# Patient Record
Sex: FEMALE | Employment: UNEMPLOYED | URBAN - METROPOLITAN AREA
[De-identification: names, ages, dates, MRNs, and addresses within clinical notes are randomized per-mention and may not be internally consistent; named-entity substitution may affect disease eponyms.]

---

## 2017-10-11 DIAGNOSIS — Z00.00 WELL ADULT EXAM: ICD-10-CM

## 2017-11-07 ENCOUNTER — HOSPITAL ENCOUNTER (OUTPATIENT)
Dept: RADIOLOGY | Facility: MEDICAL CENTER | Age: 37
End: 2017-11-07
Attending: FAMILY MEDICINE
Payer: COMMERCIAL

## 2017-11-07 ENCOUNTER — HOSPITAL ENCOUNTER (OUTPATIENT)
Facility: MEDICAL CENTER | Age: 37
End: 2017-11-07
Attending: FAMILY MEDICINE
Payer: COMMERCIAL

## 2017-11-07 ENCOUNTER — APPOINTMENT (OUTPATIENT)
Dept: OTHER | Facility: MEDICAL CENTER | Age: 37
End: 2017-11-07

## 2017-11-07 DIAGNOSIS — Z00.00 WELL ADULT EXAM: ICD-10-CM

## 2017-11-07 DIAGNOSIS — Z00.00 PHYSICAL EXAM, ROUTINE: ICD-10-CM

## 2017-11-07 LAB
25(OH)D3 SERPL-MCNC: 14 NG/ML (ref 30–100)
ALBUMIN SERPL BCP-MCNC: 4.5 G/DL (ref 3.2–4.9)
ALBUMIN/GLOB SERPL: 1.4 G/DL
ALP SERPL-CCNC: 77 U/L (ref 30–99)
ALT SERPL-CCNC: 13 U/L (ref 2–50)
ANION GAP SERPL CALC-SCNC: 10 MMOL/L (ref 0–11.9)
APPEARANCE UR: CLEAR
AST SERPL-CCNC: 18 U/L (ref 12–45)
BACTERIA #/AREA URNS HPF: NEGATIVE /HPF
BASOPHILS # BLD AUTO: 0.9 % (ref 0–1.8)
BASOPHILS # BLD: 0.06 K/UL (ref 0–0.12)
BILIRUB SERPL-MCNC: 0.9 MG/DL (ref 0.1–1.5)
BILIRUB UR QL STRIP.AUTO: NEGATIVE
BUN SERPL-MCNC: 11 MG/DL (ref 8–22)
CALCIUM SERPL-MCNC: 9.3 MG/DL (ref 8.5–10.5)
CHLORIDE SERPL-SCNC: 106 MMOL/L (ref 96–112)
CO2 SERPL-SCNC: 24 MMOL/L (ref 20–33)
COLOR UR: YELLOW
CREAT SERPL-MCNC: 0.62 MG/DL (ref 0.5–1.4)
CREAT UR-MCNC: 44.8 MG/DL
CRP SERPL HS-MCNC: 2.6 MG/L (ref 0–7.5)
EOSINOPHIL # BLD AUTO: 0.09 K/UL (ref 0–0.51)
EOSINOPHIL NFR BLD: 1.4 % (ref 0–6.9)
EPI CELLS #/AREA URNS HPF: ABNORMAL /HPF
ERYTHROCYTE [DISTWIDTH] IN BLOOD BY AUTOMATED COUNT: 46.5 FL (ref 35.9–50)
EST. AVERAGE GLUCOSE BLD GHB EST-MCNC: 114 MG/DL
GFR SERPL CREATININE-BSD FRML MDRD: >60 ML/MIN/1.73 M 2
GLOBULIN SER CALC-MCNC: 3.3 G/DL (ref 1.9–3.5)
GLUCOSE SERPL-MCNC: 83 MG/DL (ref 65–99)
GLUCOSE UR STRIP.AUTO-MCNC: NEGATIVE MG/DL
HBA1C MFR BLD: 5.6 % (ref 0–5.6)
HCT VFR BLD AUTO: 47.7 % (ref 37–47)
HGB BLD-MCNC: 15.5 G/DL (ref 12–16)
HYALINE CASTS #/AREA URNS LPF: ABNORMAL /LPF
IMM GRANULOCYTES # BLD AUTO: 0.04 K/UL (ref 0–0.11)
IMM GRANULOCYTES NFR BLD AUTO: 0.6 % (ref 0–0.9)
KETONES UR STRIP.AUTO-MCNC: NEGATIVE MG/DL
LEUKOCYTE ESTERASE UR QL STRIP.AUTO: ABNORMAL
LYMPHOCYTES # BLD AUTO: 2.32 K/UL (ref 1–4.8)
LYMPHOCYTES NFR BLD: 35.3 % (ref 22–41)
MCH RBC QN AUTO: 30 PG (ref 27–33)
MCHC RBC AUTO-ENTMCNC: 32.5 G/DL (ref 33.6–35)
MCV RBC AUTO: 92.3 FL (ref 81.4–97.8)
MICRO URNS: ABNORMAL
MICROALBUMIN UR-MCNC: <0.7 MG/DL
MICROALBUMIN/CREAT UR: NORMAL MG/G (ref 0–30)
MONOCYTES # BLD AUTO: 0.47 K/UL (ref 0–0.85)
MONOCYTES NFR BLD AUTO: 7.2 % (ref 0–13.4)
NEUTROPHILS # BLD AUTO: 3.59 K/UL (ref 2–7.15)
NEUTROPHILS NFR BLD: 54.6 % (ref 44–72)
NITRITE UR QL STRIP.AUTO: NEGATIVE
NRBC # BLD AUTO: 0 K/UL
NRBC BLD AUTO-RTO: 0 /100 WBC
PH UR STRIP.AUTO: 7 [PH]
PLATELET # BLD AUTO: 312 K/UL (ref 164–446)
PMV BLD AUTO: 11.2 FL (ref 9–12.9)
POTASSIUM SERPL-SCNC: 4.3 MMOL/L (ref 3.6–5.5)
PROT SERPL-MCNC: 7.8 G/DL (ref 6–8.2)
PROT UR QL STRIP: NEGATIVE MG/DL
RBC # BLD AUTO: 5.17 M/UL (ref 4.2–5.4)
RBC # URNS HPF: ABNORMAL /HPF
RBC UR QL AUTO: ABNORMAL
SODIUM SERPL-SCNC: 140 MMOL/L (ref 135–145)
SP GR UR STRIP.AUTO: 1.01
T4 FREE SERPL-MCNC: 0.66 NG/DL (ref 0.53–1.43)
TSH SERPL DL<=0.005 MIU/L-ACNC: 1.89 UIU/ML (ref 0.3–3.7)
UROBILINOGEN UR STRIP.AUTO-MCNC: 0.2 MG/DL
WBC # BLD AUTO: 6.6 K/UL (ref 4.8–10.8)
WBC #/AREA URNS HPF: ABNORMAL /HPF

## 2017-11-07 PROCEDURE — 306734 HCHG ADVANCED VASCULAR SCREENING

## 2017-11-07 PROCEDURE — 71020 DX-CHEST-2 VIEWS: CPT

## 2017-11-07 PROCEDURE — 76700 US EXAM ABDOM COMPLETE: CPT

## 2017-11-07 PROCEDURE — 4410556 CT-CARDIAC SCORING

## 2017-11-09 LAB — LPA SERPL-MCNC: 4 MG/DL

## 2017-11-11 LAB
CHOLEST SERPL-MCNC: 190 MG/DL
HDL PARTICAL NO Q4363: 33.1 UMOL/L
HDL SIZE Q4361: 8.6 NM
HDLC SERPL-MCNC: 48 MG/DL (ref 40–59)
HLD.LARGE SERPL-SCNC: 3.5 UMOL/L
L VLDL PART NO Q4357: 2.1 NMOL/L
LDL SERPL QN: 21.3 NM
LDL SERPL-SCNC: 1266 NMOL/L
LDL SMALL SERPL-SCNC: 378 NMOL/L
LDLC SERPL CALC-MCNC: 122 MG/DL
PATHOLOGY STUDY: ABNORMAL
TRIGL SERPL-MCNC: 98 MG/DL (ref 30–149)
VLDL SIZE Q4362: 44.2 NM

## 2017-11-16 ENCOUNTER — HOSPITAL ENCOUNTER (OUTPATIENT)
Facility: MEDICAL CENTER | Age: 37
End: 2017-11-16
Attending: FAMILY MEDICINE
Payer: COMMERCIAL

## 2017-11-18 LAB — HEMOCCULT STL QL IA: NEGATIVE

## 2017-12-14 ENCOUNTER — OFFICE VISIT (OUTPATIENT)
Dept: OTHER | Facility: MEDICAL CENTER | Age: 37
End: 2017-12-14

## 2017-12-14 ENCOUNTER — NON-PROVIDER VISIT (OUTPATIENT)
Dept: OTHER | Facility: MEDICAL CENTER | Age: 37
End: 2017-12-14

## 2017-12-14 VITALS
OXYGEN SATURATION: 97 % | HEIGHT: 65 IN | BODY MASS INDEX: 25.86 KG/M2 | HEART RATE: 60 BPM | WEIGHT: 155.2 LBS | TEMPERATURE: 97.3 F | DIASTOLIC BLOOD PRESSURE: 74 MMHG | RESPIRATION RATE: 14 BRPM | SYSTOLIC BLOOD PRESSURE: 108 MMHG

## 2017-12-14 DIAGNOSIS — Z83.3 FAMILY HISTORY OF TYPE 2 DIABETES MELLITUS IN MOTHER: ICD-10-CM

## 2017-12-14 DIAGNOSIS — R79.82 CRP ELEVATED: ICD-10-CM

## 2017-12-14 DIAGNOSIS — R73.09 ELEVATED HEMOGLOBIN A1C: ICD-10-CM

## 2017-12-14 DIAGNOSIS — E55.9 VITAMIN D DEFICIENCY: ICD-10-CM

## 2017-12-14 DIAGNOSIS — E78.5 DYSLIPIDEMIA: ICD-10-CM

## 2017-12-14 DIAGNOSIS — Z00.00 WELL ADULT EXAM: Primary | ICD-10-CM

## 2017-12-14 DIAGNOSIS — Z86.32 HISTORY OF GESTATIONAL DIABETES: ICD-10-CM

## 2017-12-14 DIAGNOSIS — Z28.39 BEHIND ON IMMUNIZATIONS: ICD-10-CM

## 2017-12-14 NOTE — PROGRESS NOTES
[facility]    EXECUTIVE HISTORY AND PHYSICAL  Performed by Dr. Bakari Woo    SUBJECTIVE:    Chief Complaint   Patient presents with   • Executive Physical   • Other     Personal history of Gestational Diabetes   • Other     Strong family history of Type 2 Diabetes   • Abnormal Labs     Abnormal Urinalysis, but she was menstruating   • Hyperlipidemia     and elevated hsCRP   • Abnormal Labs     mildly elevated Hemoglobin A1c   • Vitamin D Deficiency   • Other     Incidental extrarenal pelvis of right kidney   • Immunizations     Behind on FLU vaccine       Rachael Spain is a 37 y.o. female,   New Patient @ [facility]    Preventive medicine issues discussed:  abuse, aspirin, dental, Alcohol, Tobacco, HIV/AIDS, injuries, mental health/depression, nutrition, exercise, occupational health, sexual behavior, breast self exam, UV exposure, Cancer Screening. Vaccines.      PROBLEM #1-HISTORY OF PRESENT ILLNESS  PATIENT STATEMENT OF PROBLEM - Diabetes and Cardiovascular related issues  ONSET - discussed today  COURSE - Asymptomatic presently. She did have Gestational Diabetes with her only pregnancy a few years ago.  Both her parents have DM2. Patient has some dyslipidemia and mildly elevated A1c. Vascular Imaging reassuring, with no evidence of atherosclerosis (an extrarenal pelvis of right kidney was found incidentally).  Current pertinent labs:   HIGH & INT RISK: A1c(5.7)/LDL-P/L-HDL-P/HDL-S/Vitamin D(14)/hsCRP(2.4).  (Abnormal Urinalysis consistent with her menstruating at time of sample collection.)   INTENSITY/STATUS/LOCATION/RADIATION - variable/ asymptomatic, present  AGGRAVATORS - genetics, inadequate Therapeutic Lifestyle Changes    RELIEVERS - some Therapeutic Lifestyle Changes   TREATMENTS/COMPLIANCE/@GOAL? - same/ not 100% with Therapeutic Lifestyle Changes/ no     PROBLEM #2-HISTORY OF PRESENT ILLNESS  New Problem  PATIENT STATEMENT OF PROBLEM - She is due for annual FLU  "vaccine  ONSET - counseled today    No Known Allergies    Patient Active Problem List    Diagnosis Date Noted   • Executive History and Physical 12/15/2017   • History of gestational diabetes 12/15/2017   • Family history of type 2 diabetes mellitus in mother and father 12/15/2017   • Dyslipidemia 12/15/2017   • hsCRP elevated 12/15/2017   • Mildly elevated hemoglobin A1c 12/15/2017   • Vitamin D deficiency 12/15/2017   • Extrarenal pelvis of right kidney 12/15/2017   • Behind on immunizations 12/15/2017       No current outpatient prescriptions on file prior to visit.     No current facility-administered medications on file prior to visit.        Social History     Social History   • Marital status:      Spouse name: Dion Spain   • Number of children: N/A   • Years of education: N/A     Occupational History   • Not on file.     Social History Main Topics   • Smoking status: Never Smoker   • Smokeless tobacco: Never Used   • Alcohol use No   • Drug use: No   • Sexual activity: Yes     Partners: Male     Other Topics Concern   • Exercise Yes     Social History Narrative   • No narrative on file       Family History   Problem Relation Age of Onset   • Arthritis Mother    • Diabetes Mother    • Diabetes Father        Patient's Past, Social, and Family History reviewed     REVIEW OF SYMPTOMS:               Pertinent Positives as above.    All other systems reviewed and negative.     OBJECTIVE:    /74   Pulse 60   Temp 36.3 °C (97.3 °F)   Resp 14   Ht 1.651 m (5' 5\")   Wt 70.4 kg (155 lb 3.2 oz)   SpO2 97%   BMI 25.83 kg/m²   Body mass index is 25.83 kg/m².    Well developed, well nourished female, no acute distress, non-ill appearing. Comfortable, appears stated age, pleasant and cooperative, Alert and Oriented x 3.   HEAD: atraumatic, normocephalic   EYES: Conjunctiva normal, EOMI, PERRLA, acuity grossly intact.   EARS/NOSE/THROAT: TM's normal, no SSX of infection, no perforation, no hemotympanum, " acuity grossly intact. Oropharynx: benign, no lesions noted. Nares: benign.   NECK: supple, no adenopathy, no thyromegaly or nodules, no JVD, no carotid bruits.   CHEST/LUNGS: clear to auscultation and percussion bilaterally. No adventitious breath sounds.   CARDIOVASCULAR: regular rate and rhythm, no murmur. PMI not displaced. Good central and peripheral pulses.   BACK: no CVA tenderness.   ABDOMEN: soft, non-tender, non-distended, no masses, no hepatosplenomegaly. Normal active bowel tones.   : deferred.   Rectal: deferred.   Extremities: warm/well-perfused, no cyanosis, clubbing, or edema.   SKIN: clear, unbroken, no rashes, normal turgor.   Neuro: Mental Status: Alert and Oriented x 3. CN II-XII grossly intact. Gait normal. Non-focal, intact. Normal strength, sensation    EXERCISE STRESS TEST REPORT:    Interpreted by me    INTERPRETATION:  Patient achieved 92% of maximum predicted heart rate with physiological response in blood pressure and no associated ST segment depression.   Also, specifically no associated ST elevation, no significant ventricular extrasystoles, no ventricular tachycardia, no new atrial fibrillation or supraventricular tachycardia, and  no new heart blocks.  Patient denied chest pain, severe dyspnea, dizziness, or ataxia.     CONCLUSION:  Normal Exercise Stress Test indicating low probability of flow-limiting coronary artery disease.     ASSESSMENT:    Encounter Diagnoses   Name Primary?   • Executive History and Physical Yes   • History of gestational diabetes    • Family history of type 2 diabetes mellitus in mother and father    • Dyslipidemia    • hsCRP elevated    • Mildly elevated hemoglobin A1c    • Vitamin D deficiency    • Extrarenal pelvis of right kidney    • Behind on immunizations        PLAN:    Total Face-to-Face time spent with patient: 75 minutes  Amount of time spent counseling patient and/or coordinating care: 50 minutes    The nature of patient counseling as  "below:  -Patient Education  -Differential Diagnoses and treatment options discussed  -Risks, benefits, alternatives discussed  -Labs reviewed with patient in detail  -ETT/Imaging/PFT/vision/hearing reports reviewed with patient in detail  -Health Maintenance Exam issues discussed  -Exercise  -Dietary recommendations discussed  -Weight Loss strategies discussed  -Consider reading \"VB6: Eat Vegan Before 6 pm\", by Denilson Morocho  -Therapeutic Lifestyle Changes discussed    The nature of coordination of care as below:  -Medications added: Final decisions regarding medications to be made between patient and Primary Care Provider.  In light of very low Vitamin D level, I recommend strong consideration be given to initiating daily Vitamin D3 OTC 5000 IU.   -Medications discontinued: none  -Medications adjusted: none  -Continue present chronic medications  -Referrals: I recommend patient appoint with Primary Care Provider soon to review results of today's examination and develop a plan moving forward.   -Other: I recommend patient receive annual FLU vaccine soon  -Seek medical attention immediately if worse    FOLLOW-UP:  -With Primary Care Provider soon      "

## 2017-12-14 NOTE — LETTER
"December 15, 2017        Rachael Vallecillojordan  [address]  [place] [address]        Dear Rachael:    Thank you for participating in [facility].  I enjoyed meeting you today and performing your Executive History and Physical examination.  We covered a great deal of information, and I have summarized my Assessment and Plan below.  Please carefully review all the information in this packet.  I do suggest you schedule an appointment with a Primary Care Provider soon to review the results of your examination and develop a plan moving forward.    Overall, I consider you to be in very good health.  I am particularly pleased with your near-normal weight and your healthy mindset.  However, in terms of Cardiovascular and Diabetes related risk, you were found to have the following: personal history of gestational diabetes, strong family history of type 2 diabetes, dyslipidemia, mildly elevated hemoglobin A1c, elevated hsCRP, and Vitamin D deficiency.  The best treatment for these maladies is Therapeutic Lifestyle Changes.  Specifically, I strongly recommend eating \"real food, mostly plants, not too much\", daily exercise, striving for a normal weight/BMI, active stress management, 7-8 hours of quality sleep per night, a loving social environment, and an altruistic philosophy.  We discussed you reading the book \"Vegan Before 6 pm\".  Pleas see my comments below regarding Vitamin D3 OTC supplementation.  I do recommend you receive an annual FLU vaccine soon.  If you have any questions or concerns, please don't hesitate to call.        Sincerely,        Bakari Woo M.D.    ASSESSMENT:    Encounter Diagnoses   Name Primary?   • Executive History and Physical Yes   • History of gestational diabetes    • Family history of type 2 diabetes mellitus in mother and father    • Dyslipidemia    • hsCRP elevated    • Mildly elevated hemoglobin A1c    • Vitamin D deficiency    • Extrarenal pelvis of right " "kidney    • Behind on immunizations        PLAN:    Total Face-to-Face time spent with patient: 75 minutes  Amount of time spent counseling patient and/or coordinating care: 50 minutes    The nature of patient counseling as below:  -Patient Education  -Differential Diagnoses and treatment options discussed  -Risks, benefits, alternatives discussed  -Labs reviewed with patient in detail  -ETT/Imaging/PFT/vision/hearing reports reviewed with patient in detail  -Health Maintenance Exam issues discussed  -Exercise  -Dietary recommendations discussed  -Weight Loss strategies discussed  -Consider reading \"VB6: Eat Vegan Before 6 pm\", by Denilson Morocho  -Therapeutic Lifestyle Changes discussed    The nature of coordination of care as below:  -Medications added: Final decisions regarding medications to be made between patient and Primary Care Provider.  In light of very low Vitamin D level, I recommend strong consideration be given to initiating daily Vitamin D3 OTC 5000 IU.   -Medications discontinued: none  -Medications adjusted: none  -Continue present chronic medications  -Referrals: I recommend patient appoint with Primary Care Provider soon to review results of today's examination and develop a plan moving forward.   -Other: I recommend patient receive annual FLU vaccine soon  -Seek medical attention immediately if worse    FOLLOW-UP:  -With Primary Care Provider soon     "

## 2017-12-15 PROBLEM — R79.82 CRP ELEVATED: Status: ACTIVE | Noted: 2017-12-15

## 2017-12-15 PROBLEM — Z86.32 HISTORY OF GESTATIONAL DIABETES: Status: ACTIVE | Noted: 2017-12-15

## 2017-12-15 PROBLEM — Z00.00 WELL ADULT EXAM: Status: ACTIVE | Noted: 2017-12-15

## 2017-12-15 PROBLEM — Z83.3 FAMILY HISTORY OF TYPE 2 DIABETES MELLITUS IN MOTHER: Status: ACTIVE | Noted: 2017-12-15

## 2017-12-15 PROBLEM — Z28.39 BEHIND ON IMMUNIZATIONS: Status: ACTIVE | Noted: 2017-12-15

## 2017-12-15 PROBLEM — R73.09 ELEVATED HEMOGLOBIN A1C: Status: ACTIVE | Noted: 2017-12-15

## 2017-12-15 PROBLEM — E78.5 DYSLIPIDEMIA: Status: ACTIVE | Noted: 2017-12-15

## 2017-12-15 PROBLEM — E55.9 VITAMIN D DEFICIENCY: Status: ACTIVE | Noted: 2017-12-15

## 2017-12-15 NOTE — PATIENT INSTRUCTIONS
No current [facility]-ordered outpatient prescriptions on file.     No current [facility]-ordered facility-administered medications on file.

## 2018-01-31 ENCOUNTER — TELEPHONE (OUTPATIENT)
Dept: MEDICAL GROUP | Facility: MEDICAL CENTER | Age: 38
End: 2018-01-31

## 2018-01-31 NOTE — TELEPHONE ENCOUNTER
Left message for patient to call back regarding pre-visit planning. Please transfer call to [phone].

## 2018-09-01 ENCOUNTER — HOSPITAL ENCOUNTER (OUTPATIENT)
Dept: HOSPITAL 8 - LDOP | Age: 38
Discharge: HOME | End: 2018-09-01
Attending: OBSTETRICS & GYNECOLOGY
Payer: COMMERCIAL

## 2018-09-01 VITALS — BODY MASS INDEX: 33.35 KG/M2 | HEIGHT: 64 IN | WEIGHT: 195.33 LBS

## 2018-09-01 DIAGNOSIS — O42.92: Primary | ICD-10-CM

## 2018-09-01 DIAGNOSIS — Z3A.38: ICD-10-CM

## 2018-09-01 PROCEDURE — 99201: CPT

## 2018-09-01 PROCEDURE — G0463 HOSPITAL OUTPT CLINIC VISIT: HCPCS

## 2018-09-01 PROCEDURE — 59025 FETAL NON-STRESS TEST: CPT

## 2018-09-01 PROCEDURE — 89060 EXAM SYNOVIAL FLUID CRYSTALS: CPT

## 2022-06-10 ENCOUNTER — HOSPITAL ENCOUNTER (EMERGENCY)
Facility: MEDICAL CENTER | Age: 42
End: 2022-06-10
Attending: EMERGENCY MEDICINE
Payer: COMMERCIAL

## 2022-06-10 ENCOUNTER — APPOINTMENT (OUTPATIENT)
Dept: RADIOLOGY | Facility: MEDICAL CENTER | Age: 42
End: 2022-06-10
Attending: EMERGENCY MEDICINE
Payer: COMMERCIAL

## 2022-06-10 ENCOUNTER — TELEPHONE (OUTPATIENT)
Dept: SCHEDULING | Facility: IMAGING CENTER | Age: 42
End: 2022-06-10

## 2022-06-10 VITALS
TEMPERATURE: 36.9 F | DIASTOLIC BLOOD PRESSURE: 67 MMHG | SYSTOLIC BLOOD PRESSURE: 110 MMHG | HEART RATE: 72 BPM | OXYGEN SATURATION: 97 % | RESPIRATION RATE: 18 BRPM | WEIGHT: 148 LBS | BODY MASS INDEX: 25.27 KG/M2 | HEIGHT: 64 IN

## 2022-06-10 DIAGNOSIS — R07.9 CHEST PAIN, UNSPECIFIED TYPE: ICD-10-CM

## 2022-06-10 LAB
ALBUMIN SERPL BCP-MCNC: 4.5 G/DL (ref 3.2–4.9)
ALBUMIN/GLOB SERPL: 1.4 G/DL
ALP SERPL-CCNC: 70 U/L (ref 30–99)
ALT SERPL-CCNC: 8 U/L (ref 2–50)
ANION GAP SERPL CALC-SCNC: 12 MMOL/L (ref 7–16)
AST SERPL-CCNC: 14 U/L (ref 12–45)
BASOPHILS # BLD AUTO: 0.5 % (ref 0–1.8)
BASOPHILS # BLD: 0.04 K/UL (ref 0–0.12)
BILIRUB SERPL-MCNC: 0.7 MG/DL (ref 0.1–1.5)
BUN SERPL-MCNC: 17 MG/DL (ref 8–22)
CALCIUM SERPL-MCNC: 9.2 MG/DL (ref 8.5–10.5)
CHLORIDE SERPL-SCNC: 105 MMOL/L (ref 96–112)
CO2 SERPL-SCNC: 23 MMOL/L (ref 20–33)
CREAT SERPL-MCNC: 0.69 MG/DL (ref 0.5–1.4)
EKG IMPRESSION: NORMAL
EKG IMPRESSION: NORMAL
EOSINOPHIL # BLD AUTO: 0.07 K/UL (ref 0–0.51)
EOSINOPHIL NFR BLD: 0.8 % (ref 0–6.9)
ERYTHROCYTE [DISTWIDTH] IN BLOOD BY AUTOMATED COUNT: 45.2 FL (ref 35.9–50)
GFR SERPLBLD CREATININE-BSD FMLA CKD-EPI: 112 ML/MIN/1.73 M 2
GLOBULIN SER CALC-MCNC: 3.2 G/DL (ref 1.9–3.5)
GLUCOSE SERPL-MCNC: 119 MG/DL (ref 65–99)
HCT VFR BLD AUTO: 44.6 % (ref 37–47)
HGB BLD-MCNC: 14.7 G/DL (ref 12–16)
IMM GRANULOCYTES # BLD AUTO: 0.02 K/UL (ref 0–0.11)
IMM GRANULOCYTES NFR BLD AUTO: 0.2 % (ref 0–0.9)
LYMPHOCYTES # BLD AUTO: 2.52 K/UL (ref 1–4.8)
LYMPHOCYTES NFR BLD: 30.1 % (ref 22–41)
MCH RBC QN AUTO: 30.2 PG (ref 27–33)
MCHC RBC AUTO-ENTMCNC: 33 G/DL (ref 33.6–35)
MCV RBC AUTO: 91.6 FL (ref 81.4–97.8)
MONOCYTES # BLD AUTO: 0.74 K/UL (ref 0–0.85)
MONOCYTES NFR BLD AUTO: 8.8 % (ref 0–13.4)
NEUTROPHILS # BLD AUTO: 4.99 K/UL (ref 2–7.15)
NEUTROPHILS NFR BLD: 59.6 % (ref 44–72)
NRBC # BLD AUTO: 0 K/UL
NRBC BLD-RTO: 0 /100 WBC
PLATELET # BLD AUTO: 319 K/UL (ref 164–446)
PMV BLD AUTO: 10.6 FL (ref 9–12.9)
POTASSIUM SERPL-SCNC: 4 MMOL/L (ref 3.6–5.5)
PROT SERPL-MCNC: 7.7 G/DL (ref 6–8.2)
RBC # BLD AUTO: 4.87 M/UL (ref 4.2–5.4)
SODIUM SERPL-SCNC: 140 MMOL/L (ref 135–145)
TROPONIN T SERPL-MCNC: <6 NG/L (ref 6–19)
TROPONIN T SERPL-MCNC: <6 NG/L (ref 6–19)
WBC # BLD AUTO: 8.4 K/UL (ref 4.8–10.8)

## 2022-06-10 PROCEDURE — 84484 ASSAY OF TROPONIN QUANT: CPT

## 2022-06-10 PROCEDURE — 85025 COMPLETE CBC W/AUTO DIFF WBC: CPT

## 2022-06-10 PROCEDURE — 36415 COLL VENOUS BLD VENIPUNCTURE: CPT

## 2022-06-10 PROCEDURE — 93005 ELECTROCARDIOGRAM TRACING: CPT

## 2022-06-10 PROCEDURE — 80053 COMPREHEN METABOLIC PANEL: CPT

## 2022-06-10 PROCEDURE — 93005 ELECTROCARDIOGRAM TRACING: CPT | Performed by: EMERGENCY MEDICINE

## 2022-06-10 PROCEDURE — 71045 X-RAY EXAM CHEST 1 VIEW: CPT

## 2022-06-10 PROCEDURE — 99284 EMERGENCY DEPT VISIT MOD MDM: CPT

## 2022-06-10 ASSESSMENT — HEART SCORE
HEART SCORE: 0
HISTORY: SLIGHTLY SUSPICIOUS
AGE: <45
ECG: NORMAL
RISK FACTORS: NO KNOWN RISK FACTORS
TROPONIN: LESS THAN OR EQUAL TO NORMAL LIMIT

## 2022-06-10 NOTE — ED PROVIDER NOTES
"ED Provider Note    CHIEF COMPLAINT  Chief Complaint   Patient presents with   • Chest Pain     Pt working out today, running on treadmill per usual, then had sudden onset of central chest pain radiating to both sides of chest. Pt reports mild shortness of breath. Pain subsided with walking, lasted 3-4 min overall. No chest pain since. Went to urgent care afterward & had EKG, told to come to ED for abnormal EKG. No chest pain currently. Given ASA at .       HPI  Rachael Spain is a 42 y.o. female who presents for evaluation of chest pain.  Patient was running on the treadmill today when she developed sharp pain in the substernal/midsternal area of her chest radiating outward but not into her shoulders arms neck or back.  She states this lasted 3 minutes.  She stopped running and states that she was feeling better.  She was concerned about some type of cardiac event and went to an urgent care and they told her she needed to come to the ED for evaluation.  The patient states she works out at least 3-4 times a week doing significant cardio workouts including running on a treadmill to a high level.  She has not had any previous symptoms of chest pain or fatigue or weakness or exercise intolerance in the past.  She states there was no radiation of the pain to her neck/jaw/arms and no associated unexpected diaphoresis syncope.  She has had no swelling of the lower extremities.  She is not on any oral contraceptives.  She had bilateral tubal ligation 4 years ago.  She did have COVID in January but states it was mostly just \"a mild cold\".  She even thought it may have been a false positive test but then she lost her sense of taste and smell.  The patient has no history of: Hypertension, diabetes, dyslipidemia, premature cardiac disease in family members, tobacco use.  Patient has been vaccinated and had COVID in January.  No other acute symptomatology or complaints.    REVIEW OF SYSTEMS  See HPI for further details.  " "No history of hypertension, diabetes, thyroid dysfunction, seizures, heart disease, cardiopulmonary disorders, gastrointestinal disorders.  She denies pregnancy.  All other systems negative.    PAST MEDICAL HISTORY  No past medical history on file.    FAMILY HISTORY  Family History   Problem Relation Age of Onset   • Arthritis Mother    • Diabetes Mother    • Diabetes Father        SOCIAL HISTORY  No history of tobacco use;    SURGICAL HISTORY  Past Surgical History:   Procedure Laterality Date   • PRIMARY C SECTION  2016       CURRENT MEDICATIONS  See nurses notes    ALLERGIES  No Known Allergies    PHYSICAL EXAM  VITAL SIGNS: /77   Pulse 82   Temp 37.2 °C (99 °F) (Temporal)   Resp 15   Ht 1.626 m (5' 4\")   Wt 67.1 kg (148 lb)   LMP 06/03/2022   SpO2 97%   BMI 25.40 kg/m²    Constitutional: Pleasant 42-year-old female, well developed, Well nourished, No acute distress, Non-toxic appearance.   HENT: ,Atraumatic, Bilateral external ears normal, tympanic membranes clear, Oropharynx moist, No oral exudates, Nose normal.   Eyes: PERRL, EOMI, Conjunctiva normal, No discharge.   Neck: Normal range of motion, No tenderness, Supple, No stridor.   Lymphatic: No lymphadenopathy noted.   Cardiovascular: Normal heart rate, Normal rhythm, No murmurs, No rubs, No gallops.   Thorax & Lungs: Normal Equal breath sounds, No respiratory distress, No wheezing, no stridor, no rales. No chest tenderness.   Abdomen: Soft, nontender, nondistended, no organomegaly, positive bowel sounds normal in quality. No guarding or rebound.  Skin: Good skin turgor, pink, warm, dry. No rashes, petechiae, purpura. Normal capillary refill.   Back: No tenderness, No CVA tenderness.   Extremities: Intact distal pulses, No edema, No tenderness, No cyanosis, No clubbing. Vascular: Pulses are 2+, symmetric in the upper and lower extremities.  Musculoskeletal: Good range of motion in all major joints. No tenderness to palpation or major " deformities noted.   Neurologic: Alert & oriented x 3, Normal motor function, Normal sensory function, No gross focal deficits noted.   Psychiatric: Affect normal, Judgment normal, Mood normal.         EKG  I have interpreted: Rate normal, rhythm sinus, axis normal, ME QRS QT intervals normal, no acute STEMI, twelve-lead EKG, no acute change compared to previous tracing performed [facility] earlier today;  Repeat EKG of interpreted showing: Rate normal, rhythm sinus, axis normal, ME QRS QT intervals normal, no acute STEMI, no acute change compared to the previous tracing performed earlier today;    RADIOLOGY/PROCEDURES  DX-CHEST-PORTABLE (1 VIEW)   Final Result      No acute cardiac or pulmonary abnormalities are identified.            COURSE & MEDICAL DECISION MAKING  Pertinent Labs & Imaging studies reviewed. (See chart for details)  Laboratory studies: CBC shows white count of 8.4, 59% neutrophils, 30% lymphocytes, 8% monocytes, hemoglobin 14.7, hematocrit 44.6; CMP shows a random glucose of 119 otherwise within normal limits; troponin less than 6;  Repeat troponin less than 6;    Discussion: At this time, the patient presents for evaluation of chest pain.  The patient has a heart score of 0; initial troponin and delta troponin were normal; initial EKG and follow-up EKG were also normal; at this time, based on the patient's history and presentation and low heart score and work-up performed, I feel we can safely discharge the patient.  I discussed the findings with her.  I have asked her to follow-up with her primary care.  She does indicate that she had an exercise stress test done within the last 4 years which was normal.  I did instruct her to follow-up to ensure.  In addition, the patient was instructed that at any time should she have a change or worsening of symptoms or any new or disconcerting symptoms she is not experiencing at this time to recheck immediately.  She indicates she is  comfortable with this explanation disposition.    FINAL IMPRESSION  1. Chest pain, unspecified type         PLAN  1.  Appropriate discharge instructions given  2.  Follow-up with primary care  3.  Return if any change worsening symptoms;    Electronically signed by: Guy G Gansert, M.D., 6/10/2022 4:03 PM

## 2022-06-10 NOTE — ED TRIAGE NOTES
Rachael Spain  42 y.o.  female  Chief Complaint   Patient presents with   • Chest Pain     Pt working out today, running on treadmill per usual, then had sudden onset of central chest pain radiating to both sides of chest. Pt reports mild shortness of breath. Pain subsided with walking, lasted 3-4 min overall. No chest pain since. Went to urgent care afterward & had EKG, told to come to ED for abnormal EKG. No chest pain currently. Given ASA at .       Labs ordered & EKG done on arrival. Patient educated on triage process, to alert staff if any changes in condition.

## 2022-06-11 NOTE — DISCHARGE INSTRUCTIONS
1.  Follow-up with primary care next week; 2.  Recheck at any time should you feel you have a change or worsening symptoms or any new or concerning symptoms you are not experiencing at this time;